# Patient Record
Sex: MALE | Race: WHITE | NOT HISPANIC OR LATINO | Employment: OTHER | URBAN - METROPOLITAN AREA
[De-identification: names, ages, dates, MRNs, and addresses within clinical notes are randomized per-mention and may not be internally consistent; named-entity substitution may affect disease eponyms.]

---

## 2021-01-04 DIAGNOSIS — Z23 NEED FOR VACCINATION: ICD-10-CM

## 2021-01-04 PROCEDURE — 91300 COVID-19, MRNA, LNP-S, PF, 30 MCG/0.3 ML DOSE VACCINE: CPT | Mod: PBBFAC

## 2021-01-25 ENCOUNTER — IMMUNIZATION (OUTPATIENT)
Dept: OBSTETRICS AND GYNECOLOGY | Facility: CLINIC | Age: 81
End: 2021-01-25
Payer: MEDICARE

## 2021-01-25 DIAGNOSIS — Z23 NEED FOR VACCINATION: Primary | ICD-10-CM

## 2021-01-25 PROCEDURE — 91300 COVID-19, MRNA, LNP-S, PF, 30 MCG/0.3 ML DOSE VACCINE: CPT | Mod: PBBFAC

## 2021-01-25 PROCEDURE — 0002A COVID-19, MRNA, LNP-S, PF, 30 MCG/0.3 ML DOSE VACCINE: CPT | Mod: PBBFAC

## 2021-05-06 ENCOUNTER — OFFICE VISIT (OUTPATIENT)
Dept: OPTOMETRY | Facility: CLINIC | Age: 81
End: 2021-05-06
Payer: MEDICARE

## 2021-05-06 DIAGNOSIS — H35.3132 NONEXUDATIVE AGE-RELATED MACULAR DEGENERATION, BILATERAL, INTERMEDIATE DRY STAGE: ICD-10-CM

## 2021-05-06 DIAGNOSIS — H52.223 MYOPIA OF BOTH EYES WITH REGULAR ASTIGMATISM: ICD-10-CM

## 2021-05-06 DIAGNOSIS — Z13.5 GLAUCOMA SCREENING: ICD-10-CM

## 2021-05-06 DIAGNOSIS — H52.13 MYOPIA OF BOTH EYES WITH REGULAR ASTIGMATISM: ICD-10-CM

## 2021-05-06 DIAGNOSIS — E11.9 DIABETES MELLITUS TYPE 2 WITHOUT RETINOPATHY: Primary | ICD-10-CM

## 2021-05-06 DIAGNOSIS — H26.491 PCO (POSTERIOR CAPSULAR OPACIFICATION), RIGHT: ICD-10-CM

## 2021-05-06 PROCEDURE — 92015 PR REFRACTION: ICD-10-PCS | Mod: S$GLB,,, | Performed by: OPTOMETRIST

## 2021-05-06 PROCEDURE — 92004 COMPRE OPH EXAM NEW PT 1/>: CPT | Mod: S$GLB,,, | Performed by: OPTOMETRIST

## 2021-05-06 PROCEDURE — 3288F PR FALLS RISK ASSESSMENT DOCUMENTED: ICD-10-PCS | Mod: CPTII,S$GLB,, | Performed by: OPTOMETRIST

## 2021-05-06 PROCEDURE — 3288F FALL RISK ASSESSMENT DOCD: CPT | Mod: CPTII,S$GLB,, | Performed by: OPTOMETRIST

## 2021-05-06 PROCEDURE — 92004 PR EYE EXAM, NEW PATIENT,COMPREHESV: ICD-10-PCS | Mod: S$GLB,,, | Performed by: OPTOMETRIST

## 2021-05-06 PROCEDURE — 92015 DETERMINE REFRACTIVE STATE: CPT | Mod: S$GLB,,, | Performed by: OPTOMETRIST

## 2021-05-06 PROCEDURE — 1101F PR PT FALLS ASSESS DOC 0-1 FALLS W/OUT INJ PAST YR: ICD-10-PCS | Mod: CPTII,S$GLB,, | Performed by: OPTOMETRIST

## 2021-05-06 PROCEDURE — 99999 PR PBB SHADOW E&M-EST. PATIENT-LVL II: CPT | Mod: PBBFAC,,, | Performed by: OPTOMETRIST

## 2021-05-06 PROCEDURE — 1101F PT FALLS ASSESS-DOCD LE1/YR: CPT | Mod: CPTII,S$GLB,, | Performed by: OPTOMETRIST

## 2021-05-06 PROCEDURE — 99999 PR PBB SHADOW E&M-EST. PATIENT-LVL II: ICD-10-PCS | Mod: PBBFAC,,, | Performed by: OPTOMETRIST

## 2021-06-17 ENCOUNTER — OFFICE VISIT (OUTPATIENT)
Dept: OPTOMETRY | Facility: CLINIC | Age: 81
End: 2021-06-17
Payer: MEDICARE

## 2021-06-17 DIAGNOSIS — H52.223 MYOPIA OF BOTH EYES WITH REGULAR ASTIGMATISM: Primary | ICD-10-CM

## 2021-06-17 DIAGNOSIS — H52.13 MYOPIA OF BOTH EYES WITH REGULAR ASTIGMATISM: Primary | ICD-10-CM

## 2021-06-17 PROCEDURE — 99999 PR PBB SHADOW E&M-EST. PATIENT-LVL II: ICD-10-PCS | Mod: PBBFAC,,, | Performed by: OPTOMETRIST

## 2021-06-17 PROCEDURE — 1126F PR PAIN SEVERITY QUANTIFIED, NO PAIN PRESENT: ICD-10-PCS | Mod: S$GLB,,, | Performed by: OPTOMETRIST

## 2021-06-17 PROCEDURE — 99999 PR PBB SHADOW E&M-EST. PATIENT-LVL II: CPT | Mod: PBBFAC,,, | Performed by: OPTOMETRIST

## 2021-06-17 PROCEDURE — 1101F PT FALLS ASSESS-DOCD LE1/YR: CPT | Mod: CPTII,S$GLB,, | Performed by: OPTOMETRIST

## 2021-06-17 PROCEDURE — 1126F AMNT PAIN NOTED NONE PRSNT: CPT | Mod: S$GLB,,, | Performed by: OPTOMETRIST

## 2021-06-17 PROCEDURE — 3288F PR FALLS RISK ASSESSMENT DOCUMENTED: ICD-10-PCS | Mod: CPTII,S$GLB,, | Performed by: OPTOMETRIST

## 2021-06-17 PROCEDURE — 1101F PR PT FALLS ASSESS DOC 0-1 FALLS W/OUT INJ PAST YR: ICD-10-PCS | Mod: CPTII,S$GLB,, | Performed by: OPTOMETRIST

## 2021-06-17 PROCEDURE — 3288F FALL RISK ASSESSMENT DOCD: CPT | Mod: CPTII,S$GLB,, | Performed by: OPTOMETRIST

## 2021-06-17 PROCEDURE — 99499 NO LOS: ICD-10-PCS | Mod: S$GLB,,, | Performed by: OPTOMETRIST

## 2021-06-17 PROCEDURE — 99499 UNLISTED E&M SERVICE: CPT | Mod: S$GLB,,, | Performed by: OPTOMETRIST

## 2021-09-28 ENCOUNTER — IMMUNIZATION (OUTPATIENT)
Dept: OBSTETRICS AND GYNECOLOGY | Facility: CLINIC | Age: 81
End: 2021-09-28
Payer: MEDICARE

## 2021-09-28 DIAGNOSIS — Z23 NEED FOR VACCINATION: Primary | ICD-10-CM

## 2021-09-28 PROCEDURE — 0003A COVID-19, MRNA, LNP-S, PF, 30 MCG/0.3 ML DOSE VACCINE: CPT | Mod: PBBFAC | Performed by: FAMILY MEDICINE

## 2021-09-28 PROCEDURE — 91300 COVID-19, MRNA, LNP-S, PF, 30 MCG/0.3 ML DOSE VACCINE: CPT | Mod: PBBFAC | Performed by: FAMILY MEDICINE

## 2022-06-20 ENCOUNTER — INITIAL CONSULT (OUTPATIENT)
Dept: VASCULAR SURGERY | Facility: CLINIC | Age: 82
End: 2022-06-20
Payer: MEDICARE

## 2022-06-20 VITALS
WEIGHT: 146.63 LBS | DIASTOLIC BLOOD PRESSURE: 60 MMHG | BODY MASS INDEX: 22.29 KG/M2 | SYSTOLIC BLOOD PRESSURE: 112 MMHG

## 2022-06-20 DIAGNOSIS — I70.213 ATHEROSCLEROSIS OF NATIVE ARTERY OF BOTH LOWER EXTREMITIES WITH INTERMITTENT CLAUDICATION: Primary | ICD-10-CM

## 2022-06-20 DIAGNOSIS — I67.2 CEREBRAL ATHEROSCLEROSIS: ICD-10-CM

## 2022-06-20 PROCEDURE — 99999 PR PBB SHADOW E&M-EST. PATIENT-LVL III: CPT | Mod: PBBFAC,,, | Performed by: SURGERY

## 2022-06-20 PROCEDURE — 99204 OFFICE O/P NEW MOD 45 MIN: CPT | Mod: S$GLB,,, | Performed by: SURGERY

## 2022-06-20 PROCEDURE — 99204 PR OFFICE/OUTPT VISIT, NEW, LEVL IV, 45-59 MIN: ICD-10-PCS | Mod: S$GLB,,, | Performed by: SURGERY

## 2022-06-20 PROCEDURE — 99999 PR PBB SHADOW E&M-EST. PATIENT-LVL III: ICD-10-PCS | Mod: PBBFAC,,, | Performed by: SURGERY

## 2022-06-20 RX ORDER — CLOPIDOGREL BISULFATE 75 MG/1
75 TABLET ORAL DAILY
Qty: 30 TABLET | Refills: 11 | Status: SHIPPED | OUTPATIENT
Start: 2022-06-20 | End: 2022-10-06

## 2022-06-20 RX ORDER — CILOSTAZOL 50 MG/1
50 TABLET ORAL 2 TIMES DAILY
Qty: 90 TABLET | Refills: 11 | Status: SHIPPED | OUTPATIENT
Start: 2022-06-20 | End: 2023-06-20

## 2022-06-20 NOTE — PROGRESS NOTES
Fidencio Graves MD RPVI                       [facility]                         06/20/2022    HPI:  Janak Campbell Jr. is a 81 y.o. male with   Patient Active Problem List   Diagnosis    Lennon's neuroma    DM (diabetes mellitus)    Claudication    Peripheral vascular disease    Type II diabetes mellitus with peripheral circulatory disorder    Hypertension    Hyperlipidemia    Long term (current) use of anticoagulants [V58.61]    Deep vein thrombosis prophylaxis    s/p right total knee replacement 3/11/2015    Inguinal hernia without mention of obstruction or gangrene, unilateral or unspecified, (not specified as recurrent)    being managed by PCP and specialists who is here today for evaluation of PVD.  Patient has complaints of claudication at 2-3 block(s).  Patient states location is bilateral calf occurring for yrs.  Associated signs and symptoms include pain.  Quality is aching and severity is 7/10.  Symptoms began yrs ago; s/p kissing iliac stents approx 2012, 8x38 iCast stents.  Alleviating factors include rest and elevations.  Worsening factors include ambulation.  no rest pain.  no tissue loss.  Patient is diabetic.  Is not on Pletal.  + previous lower extremity interventions.    no MI  no Stroke  Tobacco use: denies  Daily Aspirin: yes  Anticoagulation: no    Past Medical History:   Diagnosis Date    Arthritis     OA    Diabetes mellitus type II     Encounter for blood transfusion     Hyperlipidemia     Hypertension      Past Surgical History:   Procedure Laterality Date    CAROTID ARTERY ANGIOPLASTY  2005    carotid surgery      CATARACT EXTRACTION, BILATERAL  6-4-12    ILIAC ARTERY STENT Bilateral 2013    aortic bifurcation    INGUINAL HERNIA REPAIR Right 2014    JOINT REPLACEMENT      L knee    KNEE SURGERY  03/11/2015    Right      Family History   Problem Relation Age of Onset    Cancer Father         lung     Social History     Socioeconomic  History    Marital status:    Tobacco Use    Smoking status: Former Smoker     Types: Cigarettes     Quit date: 1980     Years since quittin.0    Smokeless tobacco: Never Used   Substance and Sexual Activity    Alcohol use: Yes     Comment: Socailly    Drug use: No       Current Outpatient Medications:     aspirin (ECOTRIN) 81 MG EC tablet, Take 81 mg by mouth once daily., Disp: , Rfl:     baclofen (LIORESAL) 10 MG tablet, , Disp: , Rfl: 0    hydrocodone-acetaminophen 5-325mg (NORCO) 5-325 mg per tablet, Take 1-2 tablets by mouth every 4 (four) hours as needed for Pain., Disp: 40 tablet, Rfl: 0    lisinopril (PRINIVIL,ZESTRIL) 20 MG tablet, Takes only 10 mg every am., Disp: , Rfl:     metformin (GLUCOPHAGE) 500 MG tablet, Take by mouth. 1 Tablet Oral Twice a day , Disp: , Rfl:     methylPREDNISolone (MEDROL DOSEPACK) 4 mg tablet, , Disp: , Rfl: 0    multivitamin capsule, Take by mouth. 1 Capsule Oral Every day, Disp: , Rfl:     simvastatin (ZOCOR) 40 MG tablet, Take by mouth. 1 Tablet Oral Every day, Disp: , Rfl:     cilostazoL (PLETAL) 50 MG Tab, Take 1 tablet (50 mg total) by mouth 2 (two) times daily. If patient tolerate medication after 4 weeks, increase dose to 100 mg twice daily., Disp: 90 tablet, Rfl: 11    REVIEW OF SYSTEMS:  General: No fevers or chills; ENT: No sore throat; Allergy and Immunology: no persistent infections; Hematological and Lymphatic: No history of bleeding or easy bruising; Endocrine: negative; Respiratory: no cough, shortness of breath, or wheezing; Cardiovascular: no chest pain or dyspnea on exertion; + claudication, no rest pain; Gastrointestinal: no abdominal pain/back, change in bowel habits, or bloody stools; Genito-Urinary: no dysuria, trouble voiding, or hematuria; Musculoskeletal: negative, no wound; Neurological: no TIA or stroke symptoms; Psychiatric: no nervousness, anxiety or depression.    PHYSICAL EXAM:                General appearance:   Alert, well-appearing, and in no distress.  Oriented to person, place, and time                    Neurological: Normal speech, no focal findings noted; CN II - XII grossly intact. RLE with sensation to light touch, LLE with sensation to light touch.            Musculoskeletal: Digits/nail without cyanosis/clubbing.  Strength 5/5 BLE.                    Neck: Supple, no significant adenopathy, no carotid bruit can be auscultated                  Chest:  Clear to auscultation, no wheezes, rales or rhonchi, symmetric air entry. No use of accessory muscles               Cardiac: Normal rate and regular rhythm, S1 and S2 normal            Abdomen: Soft, nontender, nondistended, no masses or organomegaly, no hernia     No rebound tenderness noted; bowel sounds normal     Pulsatile aortic mass is non palpable.     No groin adenopathy      Extremities:2+ R femoral pulse, non palpable L femoral pulse     doppler+ R popliteal pulse, doppler+ L popliteal pulse     doppler+ R PT pulse, doppler+ L PT pulse     doppler+ R DP pulse, 2+ L DP pulse     no RLE edema, no LLE edema    Skin: RLE no tissue loss; LLE no tissue loss    LAB RESULTS:  No results found for: CBC  Lab Results   Component Value Date    LABPROT 11.1 03/12/2015    INR 1.5 04/06/2015     Lab Results   Component Value Date     05/28/2015    K 4.3 05/28/2015     05/28/2015    CO2 26 05/28/2015     (H) 05/28/2015    BUN 22 05/28/2015    CREATININE 0.9 05/28/2015    CALCIUM 9.9 05/28/2015    ANIONGAP 10 05/28/2015    EGFRNONAA >60 05/28/2015     Lab Results   Component Value Date    WBC 9.98 05/28/2015    RBC 4.89 05/28/2015    HGB 13.5 (L) 05/28/2015    HCT 41.9 05/28/2015    MCV 86 05/28/2015    MCH 27.6 05/28/2015    MCHC 32.2 05/28/2015    RDW 14.4 05/28/2015     05/28/2015    MPV 11.3 05/28/2015    GRAN 5.1 05/28/2015    GRAN 51.4 05/28/2015    LYMPH 3.4 05/28/2015    LYMPH 33.6 05/28/2015    MONO 1.1 (H) 05/28/2015    MONO 11.0  05/28/2015    EOS 0.4 05/28/2015    BASO 0.04 05/28/2015    EOSINOPHIL 3.6 05/28/2015    BASOPHIL 0.4 05/28/2015    DIFFMETHOD Automated 05/28/2015     .  Lab Results   Component Value Date    HGBA1C 5.7 (H) 08/04/2020       IMAGING:  All pertinent imaging has been reviewed and interpreted independently.    IMP/PLAN:  81 y.o. male with   Patient Active Problem List   Diagnosis    Lennon's neuroma    DM (diabetes mellitus)    Claudication    Peripheral vascular disease    Type II diabetes mellitus with peripheral circulatory disorder    Hypertension    Hyperlipidemia    Long term (current) use of anticoagulants [V58.61]    Deep vein thrombosis prophylaxis    s/p right total knee replacement 3/11/2015    Inguinal hernia without mention of obstruction or gangrene, unilateral or unspecified, (not specified as recurrent)    being managed by PCP and specialists who is here today for evaluation of PVD.    -Concern for LLE<RLE PVD with lifestyle limiting bilateral calf claudication, no rest pain, no wound.  Imaging reviewed. - rec daily ASA, Rx Pletal   -BLE arterial US and TP  -Exercise  -Carotid US  -Heart healthy lifestyle    I spent 12 minutes evaluating this patient and greater than 50% of the time was spent counseling, coordinator care and discussing the plan of care.  All questions were answered and patient stated understanding with agreement with the above treatment plan.    Fidencio Graves MD [facility]  Vascular and Endovascular Surgery

## 2022-06-23 ENCOUNTER — TELEPHONE (OUTPATIENT)
Dept: VASCULAR SURGERY | Facility: CLINIC | Age: 82
End: 2022-06-23
Payer: MEDICARE

## 2022-06-23 NOTE — TELEPHONE ENCOUNTER
Spoke to pt and advised him of his follow up appt with the MD and the med that was prescribed    ----- Message from Shantell Ha sent at 6/22/2022  9:46 AM CDT -----  Type: Patient Call Back    Who called: Xiomara - wife    What is the request in detail: pt is confused as to whether to take meds now since test arent til Sept. Please call and advise    Can the clinic reply by [facility]?no    Would the patient rather a call back or a response via [facility]? call    Best call back number.[phone] (home)

## 2022-09-22 ENCOUNTER — HOSPITAL ENCOUNTER (OUTPATIENT)
Dept: CARDIOLOGY | Facility: HOSPITAL | Age: 82
Discharge: HOME | End: 2022-09-22
Attending: SURGERY
Payer: MEDICARE

## 2022-09-22 ENCOUNTER — OFFICE VISIT (OUTPATIENT)
Dept: VASCULAR SURGERY | Facility: CLINIC | Age: 82
End: 2022-09-22
Payer: MEDICARE

## 2022-09-22 VITALS — BODY MASS INDEX: 22.66 KG/M2 | WEIGHT: 149.06 LBS

## 2022-09-22 DIAGNOSIS — I73.9 CLAUDICATION: Primary | ICD-10-CM

## 2022-09-22 DIAGNOSIS — I70.213 ATHEROSCLEROSIS OF NATIVE ARTERY OF BOTH LOWER EXTREMITIES WITH INTERMITTENT CLAUDICATION: Primary | ICD-10-CM

## 2022-09-22 DIAGNOSIS — I70.213 ATHEROSCLEROSIS OF NATIVE ARTERY OF BOTH LOWER EXTREMITIES WITH INTERMITTENT CLAUDICATION: ICD-10-CM

## 2022-09-22 DIAGNOSIS — I67.2 CEREBRAL ATHEROSCLEROSIS: ICD-10-CM

## 2022-09-22 PROCEDURE — 93880 EXTRACRANIAL BILAT STUDY: CPT

## 2022-09-22 PROCEDURE — 93922 ANKLE BRACHIAL INDICES (ABI): ICD-10-PCS | Mod: 26,,, | Performed by: SURGERY

## 2022-09-22 PROCEDURE — 93922 UPR/L XTREMITY ART 2 LEVELS: CPT

## 2022-09-22 PROCEDURE — 93925 CV US DOPPLER ARTERIAL LEGS BILATERAL (CUPID ONLY): ICD-10-PCS | Mod: 26,,, | Performed by: SURGERY

## 2022-09-22 PROCEDURE — 93922 UPR/L XTREMITY ART 2 LEVELS: CPT | Mod: 26,,, | Performed by: SURGERY

## 2022-09-22 PROCEDURE — 93925 LOWER EXTREMITY STUDY: CPT

## 2022-09-22 PROCEDURE — 93880 CV US DOPPLER CAROTID (CUPID ONLY): ICD-10-PCS | Mod: 26,,, | Performed by: SURGERY

## 2022-09-22 PROCEDURE — 93925 LOWER EXTREMITY STUDY: CPT | Mod: 26,,, | Performed by: SURGERY

## 2022-09-22 PROCEDURE — 99213 PR OFFICE/OUTPT VISIT, EST, LEVL III, 20-29 MIN: ICD-10-PCS | Mod: S$GLB,,, | Performed by: SURGERY

## 2022-09-22 PROCEDURE — 93880 EXTRACRANIAL BILAT STUDY: CPT | Mod: 26,,, | Performed by: SURGERY

## 2022-09-22 PROCEDURE — 1159F MED LIST DOCD IN RCRD: CPT | Mod: CPTII,S$GLB,, | Performed by: SURGERY

## 2022-09-22 PROCEDURE — 99999 PR PBB SHADOW E&M-EST. PATIENT-LVL III: ICD-10-PCS | Mod: PBBFAC,,, | Performed by: SURGERY

## 2022-09-22 PROCEDURE — 1126F AMNT PAIN NOTED NONE PRSNT: CPT | Mod: CPTII,S$GLB,, | Performed by: SURGERY

## 2022-09-22 PROCEDURE — 1126F PR PAIN SEVERITY QUANTIFIED, NO PAIN PRESENT: ICD-10-PCS | Mod: CPTII,S$GLB,, | Performed by: SURGERY

## 2022-09-22 PROCEDURE — 99213 OFFICE O/P EST LOW 20 MIN: CPT | Mod: S$GLB,,, | Performed by: SURGERY

## 2022-09-22 PROCEDURE — 1159F PR MEDICATION LIST DOCUMENTED IN MEDICAL RECORD: ICD-10-PCS | Mod: CPTII,S$GLB,, | Performed by: SURGERY

## 2022-09-22 PROCEDURE — 99999 PR PBB SHADOW E&M-EST. PATIENT-LVL III: CPT | Mod: PBBFAC,,, | Performed by: SURGERY

## 2022-09-22 NOTE — PROGRESS NOTES
[facility]                         09/22/2022    HPI:  Janak Campbell Jr. is a 81 y.o. male with   Patient Active Problem List   Diagnosis    Lennon's neuroma    DM (diabetes mellitus)    Claudication    Peripheral vascular disease    Type II diabetes mellitus with peripheral circulatory disorder    Hypertension    Hyperlipidemia    Long term (current) use of anticoagulants [V58.61]    Deep vein thrombosis prophylaxis    s/p right total knee replacement 3/11/2015    Inguinal hernia without mention of obstruction or gangrene, unilateral or unspecified, (not specified as recurrent)    being managed by PCP and specialists who is here today for evaluation of PVD.  Patient has complaints of claudication at 2-3 block(s).  Patient states location is bilateral calf occurring for yrs.  Associated signs and symptoms include pain.  Quality is aching and severity is 7/10.  Symptoms began yrs ago; s/p kissing iliac stents approx 2012, 8x38 iCast stents.  Alleviating factors include rest and elevations.  Worsening factors include ambulation.  no rest pain.  no tissue loss.  Patient is diabetic.  Is on Pletal.      no MI  no Stroke  Tobacco use: denies  Daily Aspirin: yes  Anticoagulation: no    9/2022:      Past Medical History:   Diagnosis Date    Arthritis     OA    Diabetes mellitus type II     Encounter for blood transfusion     Hyperlipidemia     Hypertension      Past Surgical History:   Procedure Laterality Date    CAROTID ARTERY ANGIOPLASTY  2005    carotid surgery      CATARACT EXTRACTION, BILATERAL  6-4-12    ILIAC ARTERY STENT Bilateral 2013    aortic bifurcation    INGUINAL HERNIA REPAIR Right 2014    JOINT REPLACEMENT      L knee    KNEE SURGERY  03/11/2015    Right      Family History   Problem Relation Age of Onset    Cancer Father         lung     Social History     Socioeconomic History    Marital status:    Tobacco Use    Smoking status: Former     Types:  Cigarettes     Quit date: 1980     Years since quittin.2    Smokeless tobacco: Never   Substance and Sexual Activity    Alcohol use: Yes     Comment: Socailly    Drug use: No       Current Outpatient Medications:     aspirin (ECOTRIN) 81 MG EC tablet, Take 81 mg by mouth once daily., Disp: , Rfl:     baclofen (LIORESAL) 10 MG tablet, , Disp: , Rfl: 0    cilostazoL (PLETAL) 50 MG Tab, Take 1 tablet (50 mg total) by mouth 2 (two) times daily. If patient tolerate medication after 4 weeks, increase dose to 100 mg twice daily., Disp: 90 tablet, Rfl: 11    clopidogreL (PLAVIX) 75 mg tablet, Take 1 tablet (75 mg total) by mouth once daily., Disp: 30 tablet, Rfl: 11    hydrocodone-acetaminophen 5-325mg (NORCO) 5-325 mg per tablet, Take 1-2 tablets by mouth every 4 (four) hours as needed for Pain., Disp: 40 tablet, Rfl: 0    lisinopril (PRINIVIL,ZESTRIL) 20 MG tablet, Takes only 10 mg every am., Disp: , Rfl:     metformin (GLUCOPHAGE) 500 MG tablet, Take by mouth. 1 Tablet Oral Twice a day , Disp: , Rfl:     methylPREDNISolone (MEDROL DOSEPACK) 4 mg tablet, , Disp: , Rfl: 0    multivitamin capsule, Take by mouth. 1 Capsule Oral Every day, Disp: , Rfl:     simvastatin (ZOCOR) 40 MG tablet, Take by mouth. 1 Tablet Oral Every day, Disp: , Rfl:     REVIEW OF SYSTEMS:  General: No fevers or chills; ENT: No sore throat; Allergy and Immunology: no persistent infections; Hematological and Lymphatic: No history of bleeding or easy bruising; Endocrine: negative; Respiratory: no cough, shortness of breath, or wheezing; Cardiovascular: no chest pain or dyspnea on exertion; + claudication, no rest pain; Gastrointestinal: no abdominal pain/back, change in bowel habits, or bloody stools; Genito-Urinary: no dysuria, trouble voiding, or hematuria; Musculoskeletal: negative, no wound; Neurological: no TIA or stroke symptoms; Psychiatric: no nervousness, anxiety or depression.    PHYSICAL EXAM:                General appearance:   Alert, well-appearing, and in no distress.  Oriented to person, place, and time                    Neurological: Normal speech, no focal findings noted; CN II - XII grossly intact. RLE with sensation to light touch, LLE with sensation to light touch.            Musculoskeletal: Digits/nail without cyanosis/clubbing.  Strength 5/5 BLE.                    Neck: Supple, no significant adenopathy, no carotid bruit can be auscultated                  Chest:  Clear to auscultation, no wheezes, rales or rhonchi, symmetric air entry. No use of accessory muscles               Cardiac: Normal rate and regular rhythm, S1 and S2 normal            Abdomen: Soft, nontender, nondistended, no masses or organomegaly, no hernia     No rebound tenderness noted; bowel sounds normal     Pulsatile aortic mass is non palpable.     No groin adenopathy      Extremities:2+ R femoral pulse, non palpable L femoral pulse     doppler+ R popliteal pulse, doppler+ L popliteal pulse     doppler+ R PT pulse, doppler+ L PT pulse     doppler+ R DP pulse, 2+ L DP pulse     no RLE edema, no LLE edema    Skin: RLE no tissue loss; LLE no tissue loss    LAB RESULTS:  No results found for: CBC  Lab Results   Component Value Date    LABPROT 11.1 03/12/2015    INR 1.5 04/06/2015     Lab Results   Component Value Date     05/28/2015    K 4.3 05/28/2015     05/28/2015    CO2 26 05/28/2015     (H) 05/28/2015    BUN 22 05/28/2015    CREATININE 0.9 05/28/2015    CALCIUM 9.9 05/28/2015    ANIONGAP 10 05/28/2015    EGFRNONAA >60 05/28/2015     Lab Results   Component Value Date    WBC 9.98 05/28/2015    RBC 4.89 05/28/2015    HGB 13.5 (L) 05/28/2015    HCT 41.9 05/28/2015    MCV 86 05/28/2015    MCH 27.6 05/28/2015    MCHC 32.2 05/28/2015    RDW 14.4 05/28/2015     05/28/2015    MPV 11.3 05/28/2015    GRAN 5.1 05/28/2015    GRAN 51.4 05/28/2015    LYMPH 3.4 05/28/2015    LYMPH 33.6 05/28/2015    MONO 1.1 (H) 05/28/2015    MONO 11.0  05/28/2015    EOS 0.4 05/28/2015    BASO 0.04 05/28/2015    EOSINOPHIL 3.6 05/28/2015    BASOPHIL 0.4 05/28/2015    DIFFMETHOD Automated 05/28/2015     .  Lab Results   Component Value Date    HGBA1C 5.7 (H) 08/04/2020       IMAGING:  All pertinent imaging has been reviewed and interpreted independently.    IMP/PLAN:  81 y.o. male with   Patient Active Problem List   Diagnosis    Lennon's neuroma    DM (diabetes mellitus)    Claudication    Peripheral vascular disease    Type II diabetes mellitus with peripheral circulatory disorder    Hypertension    Hyperlipidemia    Long term (current) use of anticoagulants [V58.61]    Deep vein thrombosis prophylaxis    s/p right total knee replacement 3/11/2015    Inguinal hernia without mention of obstruction or gangrene, unilateral or unspecified, (not specified as recurrent)    being managed by PCP and specialists who is here today for evaluation of PVD.    -Concern for LLE<RLE PVD with lifestyle limiting bilateral calf claudication, no rest pain, no wound.  Imaging reviewed. - rec daily ASA, Pletal, Plavix  -Exercise  -Heart healthy lifestyle  - RTC 6 month with LAURITA/TP and BLE US    I spent 20 minutes evaluating this patient and greater than 50% of the time was spent counseling, coordinator care and discussing the plan of care.  All questions were answered and patient stated understanding with agreement with the above treatment plan.    Valente Richardson NP  Vascular and Endovascular Surgery

## 2022-10-04 LAB
LEFT ABI: 0.79
LEFT ANT TIBIAL SYS PSV: 86 CM/S
LEFT ARM BP: 161 MMHG
LEFT CBA DIAS: 19 CM/S
LEFT CBA SYS: 84 CM/S
LEFT CCA DIST DIAS: 9 CM/S
LEFT CCA DIST SYS: 46 CM/S
LEFT CCA MID DIAS: 8 CM/S
LEFT CCA MID SYS: 57 CM/S
LEFT CCA PROX DIAS: 10 CM/S
LEFT CCA PROX SYS: 91 CM/S
LEFT CFA PSV: 162 CM/S
LEFT DORSALIS PEDIS: 134 MMHG
LEFT ECA DIAS: 3 CM/S
LEFT ECA SYS: 239 CM/S
LEFT EXTERNAL ILIAC PSV: 206 CM/S
LEFT ICA DIST DIAS: 40 CM/S
LEFT ICA DIST SYS: 125 CM/S
LEFT ICA MID DIAS: 25 CM/S
LEFT ICA MID SYS: 93 CM/S
LEFT ICA PROX DIAS: 29 CM/S
LEFT ICA PROX SYS: 93 CM/S
LEFT PERONEAL SYS PSV: 52 CM/S
LEFT POPLITEAL PSV: 57 CM/S
LEFT POST TIBIAL SYS PSV: 0 CM/S
LEFT POSTERIOR TIBIAL: 120 MMHG
LEFT PROFUNDA SYS PSV: 136 CM/S
LEFT SUPER FEMORAL DIST SYS PSV: 126 CM/S
LEFT SUPER FEMORAL MID SYS PSV: 120 CM/S
LEFT SUPER FEMORAL OSTIAL SYS PSV: 121 CM/S
LEFT SUPER FEMORAL PROX SYS PSV: 245 CM/S
LEFT TBI: 0.84
LEFT TIB/PER TRUNK SYS PSV: 41 CM/S
LEFT TOE PRESSURE: 142 MMHG
LEFT VERTEBRAL DIAS: 19 CM/S
LEFT VERTEBRAL SYS: 71 CM/S
OHS CV CAROTID RIGHT ICA EDV HIGHEST: 28
OHS CV CAROTID ULTRASOUND LEFT ICA/CCA RATIO: 2.72
OHS CV CAROTID ULTRASOUND RIGHT ICA/CCA RATIO: 2.5
OHS CV LEFT LOWER EXTREMITY ABI (NO CALC): 0.79
OHS CV PV CAROTID LEFT HIGHEST CCA: 91
OHS CV PV CAROTID LEFT HIGHEST ICA: 125
OHS CV PV CAROTID RIGHT HIGHEST CCA: 74
OHS CV PV CAROTID RIGHT HIGHEST ICA: 125
OHS CV RIGHT ABI LOWER EXTREMITY (NO CALC): 0.74
OHS CV US CAROTID LEFT HIGHEST EDV: 40
RIGHT ABI: 0.74
RIGHT ANT TIBIAL SYS PSV: 33 CM/S
RIGHT ARM BP: 169 MMHG
RIGHT CBA DIAS: 9 CM/S
RIGHT CBA SYS: 43 CM/S
RIGHT CCA DIST DIAS: 11 CM/S
RIGHT CCA DIST SYS: 50 CM/S
RIGHT CCA MID DIAS: 11 CM/S
RIGHT CCA MID SYS: 45 CM/S
RIGHT CCA PROX DIAS: 9 CM/S
RIGHT CCA PROX SYS: 74 CM/S
RIGHT CFA PSV: 115 CM/S
RIGHT DORSALIS PEDIS: 118 MMHG
RIGHT ECA DIAS: 5 CM/S
RIGHT ECA SYS: 67 CM/S
RIGHT EXTERNAL ILLIAC PSV: 132 CM/S
RIGHT ICA DIST DIAS: 15 CM/S
RIGHT ICA DIST SYS: 74 CM/S
RIGHT ICA MID DIAS: 28 CM/S
RIGHT ICA MID SYS: 125 CM/S
RIGHT ICA PROX DIAS: 14 CM/S
RIGHT ICA PROX SYS: 48 CM/S
RIGHT PERONEAL SYS PSV: 65 CM/S
RIGHT POPLITEAL PSV: 41 CM/S
RIGHT POST TIBIAL SYS PSV: 0 CM/S
RIGHT POSTERIOR TIBIAL: 125 MMHG
RIGHT PROFUNDA SYS PSV: 168 CM/S
RIGHT SUPER FEMORAL DIST SYS PSV: 43 CM/S
RIGHT SUPER FEMORAL MID SYS PSV: 96 CM/S
RIGHT SUPER FEMORAL OSTIAL SYS PSV: 82 CM/S
RIGHT SUPER FEMORAL PROX SYS PSV: 600 CM/S
RIGHT TBI: 0.61
RIGHT TIB/PER TRUNK SYS PSV: 38 CM/S
RIGHT TOE PRESSURE: 103 MMHG
RIGHT VERTEBRAL DIAS: 11 CM/S
RIGHT VERTEBRAL SYS: 52 CM/S

## 2022-10-12 RX ORDER — CLOPIDOGREL BISULFATE 75 MG/1
75 TABLET ORAL DAILY
Qty: 30 TABLET | Refills: 11 | Status: SHIPPED | OUTPATIENT
Start: 2022-10-12 | End: 2023-02-21 | Stop reason: SDUPTHER

## 2022-10-17 ENCOUNTER — TELEPHONE (OUTPATIENT)
Dept: VASCULAR SURGERY | Facility: CLINIC | Age: 82
End: 2022-10-17
Payer: MEDICARE

## 2022-10-17 NOTE — TELEPHONE ENCOUNTER
Attempted to call the pt twice no answer left voicemail      ----- Message from Fidencio Graves MD sent at 10/14/2022  5:20 PM CDT -----  Please get hard copy prescription for me to fill out Monday, thank you  ----- Message -----  From: Kamran Burleson LPN  Sent: 10/13/2022  12:57 PM CDT  To: Fidencio Graves MD    [facility] wants a script faxed to them [phone].    ----- Message -----  From: Fidencio Graves MD  Sent: 10/12/2022   5:03 PM CDT  To: Kamran Burleson LPN    I resent it, plesaes have them contact [facility]  ----- Message -----  From: Kamran Burleson LPN  Sent: 10/11/2022  12:01 PM CDT  To: Fidencio Graves MD      ----- Message -----  From: Sharon Waters  Sent: 10/11/2022  11:26 AM CDT  To: [facility] Staff    Type: Patient Call Back    Who called: wife     What is the request in detail: Patient's wife called [facility] and was told they never received the refill for patient's Plavix. Would like to know if a nurse can call them and find out what's going on with his medication. [facility] [phone]     Can the clinic reply by [facility]? No     Would the patient rather a call back or a response via [facility]? Call back     Best call back number: [phone]

## 2023-02-21 ENCOUNTER — HOSPITAL ENCOUNTER (EMERGENCY)
Facility: HOSPITAL | Age: 83
Discharge: HOME | End: 2023-02-21
Attending: EMERGENCY MEDICINE
Payer: MEDICARE

## 2023-02-21 VITALS
BODY MASS INDEX: 22.13 KG/M2 | RESPIRATION RATE: 22 BRPM | DIASTOLIC BLOOD PRESSURE: 93 MMHG | HEART RATE: 88 BPM | SYSTOLIC BLOOD PRESSURE: 177 MMHG | OXYGEN SATURATION: 95 % | HEIGHT: 68 IN | WEIGHT: 146 LBS | TEMPERATURE: 98 F

## 2023-02-21 DIAGNOSIS — I10 PRIMARY HYPERTENSION: ICD-10-CM

## 2023-02-21 DIAGNOSIS — G45.9 TIA (TRANSIENT ISCHEMIC ATTACK): Primary | ICD-10-CM

## 2023-02-21 LAB
ALBUMIN SERPL BCP-MCNC: 3.9 G/DL (ref 3.5–5.2)
ALP SERPL-CCNC: 52 U/L (ref 55–135)
ALT SERPL W/O P-5'-P-CCNC: 16 U/L (ref 10–44)
ANION GAP SERPL CALC-SCNC: 14 MMOL/L (ref 8–16)
AST SERPL-CCNC: 17 U/L (ref 10–40)
BASOPHILS # BLD AUTO: 0.08 K/UL (ref 0–0.2)
BASOPHILS NFR BLD: 0.8 % (ref 0–1.9)
BILIRUB SERPL-MCNC: 0.4 MG/DL (ref 0.1–1)
BUN SERPL-MCNC: 23 MG/DL (ref 8–23)
CALCIUM SERPL-MCNC: 9.6 MG/DL (ref 8.7–10.5)
CHLORIDE SERPL-SCNC: 96 MMOL/L (ref 95–110)
CO2 SERPL-SCNC: 26 MMOL/L (ref 23–29)
CREAT SERPL-MCNC: 1 MG/DL (ref 0.5–1.4)
DIFFERENTIAL METHOD: ABNORMAL
EOSINOPHIL # BLD AUTO: 0.4 K/UL (ref 0–0.5)
EOSINOPHIL NFR BLD: 4.1 % (ref 0–8)
ERYTHROCYTE [DISTWIDTH] IN BLOOD BY AUTOMATED COUNT: 22.5 % (ref 11.5–14.5)
EST. GFR  (NO RACE VARIABLE): >60 ML/MIN/1.73 M^2
GLUCOSE SERPL-MCNC: 99 MG/DL (ref 70–110)
HCT VFR BLD AUTO: 36.4 % (ref 40–54)
HCV AB SERPL QL IA: NORMAL
HGB BLD-MCNC: 11.7 G/DL (ref 14–18)
HIV 1+2 AB+HIV1 P24 AG SERPL QL IA: NORMAL
IMM GRANULOCYTES # BLD AUTO: 0.03 K/UL (ref 0–0.04)
IMM GRANULOCYTES NFR BLD AUTO: 0.3 % (ref 0–0.5)
INR PPP: 1 (ref 0.8–1.2)
LYMPHOCYTES # BLD AUTO: 3 K/UL (ref 1–4.8)
LYMPHOCYTES NFR BLD: 29.8 % (ref 18–48)
MCH RBC QN AUTO: 25.9 PG (ref 27–31)
MCHC RBC AUTO-ENTMCNC: 32.1 G/DL (ref 32–36)
MCV RBC AUTO: 81 FL (ref 82–98)
MONOCYTES # BLD AUTO: 1.1 K/UL (ref 0.3–1)
MONOCYTES NFR BLD: 11.2 % (ref 4–15)
NEUTROPHILS # BLD AUTO: 5.5 K/UL (ref 1.8–7.7)
NEUTROPHILS NFR BLD: 53.8 % (ref 38–73)
NRBC BLD-RTO: 0 /100 WBC
PLATELET # BLD AUTO: 236 K/UL (ref 150–450)
PMV BLD AUTO: 9.5 FL (ref 9.2–12.9)
POTASSIUM SERPL-SCNC: 4.6 MMOL/L (ref 3.5–5.1)
PROT SERPL-MCNC: 6.5 G/DL (ref 6–8.4)
PROTHROMBIN TIME: 10.6 SEC (ref 9–12.5)
RBC # BLD AUTO: 4.51 M/UL (ref 4.6–6.2)
SODIUM SERPL-SCNC: 136 MMOL/L (ref 136–145)
TSH SERPL DL<=0.005 MIU/L-ACNC: 2.67 UIU/ML (ref 0.4–4)
WBC # BLD AUTO: 10.16 K/UL (ref 3.9–12.7)

## 2023-02-21 PROCEDURE — 99285 EMERGENCY DEPT VISIT HI MDM: CPT | Mod: ,,, | Performed by: EMERGENCY MEDICINE

## 2023-02-21 PROCEDURE — 99284 PR EMERGENCY DEPT VISIT,LEVEL IV: ICD-10-PCS | Mod: GC,,, | Performed by: PSYCHIATRY & NEUROLOGY

## 2023-02-21 PROCEDURE — 80053 COMPREHEN METABOLIC PANEL: CPT | Performed by: EMERGENCY MEDICINE

## 2023-02-21 PROCEDURE — 85025 COMPLETE CBC W/AUTO DIFF WBC: CPT | Performed by: EMERGENCY MEDICINE

## 2023-02-21 PROCEDURE — 93010 EKG 12-LEAD: ICD-10-PCS | Mod: ,,, | Performed by: INTERNAL MEDICINE

## 2023-02-21 PROCEDURE — 93010 ELECTROCARDIOGRAM REPORT: CPT | Mod: ,,, | Performed by: INTERNAL MEDICINE

## 2023-02-21 PROCEDURE — 25000003 PHARM REV CODE 250: Performed by: EMERGENCY MEDICINE

## 2023-02-21 PROCEDURE — 87389 HIV-1 AG W/HIV-1&-2 AB AG IA: CPT | Performed by: PHYSICIAN ASSISTANT

## 2023-02-21 PROCEDURE — 85610 PROTHROMBIN TIME: CPT | Performed by: EMERGENCY MEDICINE

## 2023-02-21 PROCEDURE — 99285 EMERGENCY DEPT VISIT HI MDM: CPT | Mod: 25

## 2023-02-21 PROCEDURE — 99284 EMERGENCY DEPT VISIT MOD MDM: CPT | Mod: GC,,, | Performed by: PSYCHIATRY & NEUROLOGY

## 2023-02-21 PROCEDURE — 93005 ELECTROCARDIOGRAM TRACING: CPT

## 2023-02-21 PROCEDURE — 99285 PR EMERGENCY DEPT VISIT,LEVEL V: ICD-10-PCS | Mod: ,,, | Performed by: EMERGENCY MEDICINE

## 2023-02-21 PROCEDURE — 84443 ASSAY THYROID STIM HORMONE: CPT | Performed by: EMERGENCY MEDICINE

## 2023-02-21 PROCEDURE — 86803 HEPATITIS C AB TEST: CPT | Performed by: PHYSICIAN ASSISTANT

## 2023-02-21 RX ORDER — CLOPIDOGREL BISULFATE 75 MG/1
75 TABLET ORAL DAILY
Qty: 30 TABLET | Refills: 11 | Status: SHIPPED | OUTPATIENT
Start: 2023-02-21 | End: 2024-02-21

## 2023-02-21 RX ORDER — CLOPIDOGREL BISULFATE 75 MG/1
75 TABLET ORAL
Status: COMPLETED | OUTPATIENT
Start: 2023-02-21 | End: 2023-02-21

## 2023-02-21 RX ORDER — LISINOPRIL 10 MG/1
10 TABLET ORAL
Status: COMPLETED | OUTPATIENT
Start: 2023-02-21 | End: 2023-02-21

## 2023-02-21 RX ORDER — LISINOPRIL 20 MG/1
10 TABLET ORAL DAILY
Qty: 30 TABLET | Refills: 0 | Status: SHIPPED | OUTPATIENT
Start: 2023-02-21

## 2023-02-21 RX ADMIN — LISINOPRIL 10 MG: 10 TABLET ORAL at 08:02

## 2023-02-21 RX ADMIN — CLOPIDOGREL BISULFATE 75 MG: 75 TABLET ORAL at 08:02

## 2023-02-22 ENCOUNTER — HOSPITAL ENCOUNTER (EMERGENCY)
Facility: HOSPITAL | Age: 83
Discharge: HOME | End: 2023-02-22
Attending: EMERGENCY MEDICINE
Payer: MEDICARE

## 2023-02-22 VITALS
SYSTOLIC BLOOD PRESSURE: 143 MMHG | BODY MASS INDEX: 22.13 KG/M2 | WEIGHT: 146 LBS | OXYGEN SATURATION: 94 % | RESPIRATION RATE: 18 BRPM | TEMPERATURE: 98 F | HEIGHT: 68 IN | HEART RATE: 88 BPM | DIASTOLIC BLOOD PRESSURE: 67 MMHG

## 2023-02-22 DIAGNOSIS — R51.9 NONINTRACTABLE HEADACHE, UNSPECIFIED CHRONICITY PATTERN, UNSPECIFIED HEADACHE TYPE: Primary | ICD-10-CM

## 2023-02-22 PROCEDURE — 96374 THER/PROPH/DIAG INJ IV PUSH: CPT

## 2023-02-22 PROCEDURE — 99284 PR EMERGENCY DEPT VISIT,LEVEL IV: ICD-10-PCS | Mod: ,,, | Performed by: EMERGENCY MEDICINE

## 2023-02-22 PROCEDURE — 63600175 PHARM REV CODE 636 W HCPCS

## 2023-02-22 PROCEDURE — 99284 EMERGENCY DEPT VISIT MOD MDM: CPT | Mod: ,,, | Performed by: EMERGENCY MEDICINE

## 2023-02-22 PROCEDURE — 99284 EMERGENCY DEPT VISIT MOD MDM: CPT | Mod: 25

## 2023-02-22 RX ORDER — KETOROLAC TROMETHAMINE 30 MG/ML
10 INJECTION, SOLUTION INTRAMUSCULAR; INTRAVENOUS
Status: COMPLETED | OUTPATIENT
Start: 2023-02-22 | End: 2023-02-22

## 2023-02-22 RX ADMIN — KETOROLAC TROMETHAMINE 10 MG: 30 INJECTION, SOLUTION INTRAMUSCULAR; INTRAVENOUS at 04:02

## 2023-02-22 NOTE — ED TRIAGE NOTES
"Presents from triage with c/o headache in the back of the head that "comes and goes." Pt also c/o nausea. Pt states head hurts wherever he lays his head. Pt states he took 2 tylenol pms about 2-3 hours ago.     Past Medical History:   Diagnosis Date    Arthritis     OA    Diabetes mellitus type II     Encounter for blood transfusion     Hyperlipidemia     Hypertension        Past Surgical History:   Procedure Laterality Date    CAROTID ARTERY ANGIOPLASTY  2005    carotid surgery      CATARACT EXTRACTION, BILATERAL  12    ILIAC ARTERY STENT Bilateral 2013    aortic bifurcation    INGUINAL HERNIA REPAIR Right 2014    JOINT REPLACEMENT      L knee    KNEE SURGERY  2015    Right        Family History   Problem Relation Age of Onset    Cancer Father         lung       Social History     Socioeconomic History    Marital status:    Tobacco Use    Smoking status: Former     Types: Cigarettes     Quit date: 1980     Years since quittin.6    Smokeless tobacco: Never   Substance and Sexual Activity    Alcohol use: Yes     Comment: Socailly    Drug use: No    Sexual activity: Not Currently       No current facility-administered medications for this encounter.     Current Outpatient Medications   Medication Sig Dispense Refill    aspirin (ECOTRIN) 81 MG EC tablet Take 81 mg by mouth once daily.      baclofen (LIORESAL) 10 MG tablet   0    cilostazoL (PLETAL) 50 MG Tab Take 1 tablet (50 mg total) by mouth 2 (two) times daily. If patient tolerate medication after 4 weeks, increase dose to 100 mg twice daily. 90 tablet 11    clopidogreL (PLAVIX) 75 mg tablet Take 1 tablet (75 mg total) by mouth once daily. 30 tablet 11    clopidogreL (PLAVIX) 75 mg tablet Take 1 tablet (75 mg total) by mouth once daily. 30 tablet 11    hydrocodone-acetaminophen 5-325mg (NORCO) 5-325 mg per tablet Take 1-2 tablets by mouth every 4 (four) hours as needed for Pain. 40 tablet 0    lisinopriL (PRINIVIL,ZESTRIL) 20 MG tablet " Take 0.5 tablets (10 mg total) by mouth once daily. Takes only 10 mg every am. 30 tablet 0    metformin (GLUCOPHAGE) 500 MG tablet Take by mouth. 1 Tablet Oral Twice a day       methylPREDNISolone (MEDROL DOSEPACK) 4 mg tablet   0    multivitamin capsule Take by mouth. 1 Capsule Oral Every day      simvastatin (ZOCOR) 40 MG tablet Take by mouth. 1 Tablet Oral Every day         Review of patient's allergies indicates:   Allergen Reactions    Adhesive Other (See Comments)     All tape pulls & tears skin. Paper tape is OK to use, pt. States.

## 2023-02-22 NOTE — ED NOTES
Pt identifiers Janak OCONNELL Adrian Richter Were checked and are correct  LOC: The patient is awake, alert, aware of environment with an appropriate affect. Oriented x4, speaking appropriately  APPEARANCE: Pt resting comfortably, in no acute distress, pt is clean and well groomed, clothing properly fastened  SKIN: Skin warm, dry and intact, normal skin turgor, moist mucus membranes  RESPIRATORY: Airway is open and patent, respirations are spontaneous, even and unlabored, normal effort and rate Breath sounds clear gerry to all lung fields on auscultation  CARDIAC: Radial pulses strong and irregular , no peripheral edema noted, capillary refill < 3 seconds, bilateral radial pulses 2+  ABDOMEN: Soft, nontender, nondistended. Bowel sounds present   NEUROLOGIC: PERRL, facial expression is symmetrical, patient moving all extremities spontaneously, normal sensation in all extremities when touched with a finger.  Follows all commands appropriately  MUSCULOSKELETAL: No obvious deformities.

## 2023-02-22 NOTE — SUBJECTIVE & OBJECTIVE
Past Medical History:   Diagnosis Date    Arthritis     OA    Diabetes mellitus type II     Encounter for blood transfusion     Hyperlipidemia     Hypertension      Past Surgical History:   Procedure Laterality Date    CAROTID ARTERY ANGIOPLASTY  2005    carotid surgery      CATARACT EXTRACTION, BILATERAL  12    ILIAC ARTERY STENT Bilateral     aortic bifurcation    INGUINAL HERNIA REPAIR Right 2014    JOINT REPLACEMENT      L knee    KNEE SURGERY  2015    Right      Family History   Problem Relation Age of Onset    Cancer Father         lung     Social History     Tobacco Use    Smoking status: Former     Types: Cigarettes     Quit date: 1980     Years since quittin.6    Smokeless tobacco: Never   Substance Use Topics    Alcohol use: Yes     Comment: Socailly    Drug use: No     Review of patient's allergies indicates:   Allergen Reactions    Adhesive Other (See Comments)     All tape pulls & tears skin. Paper tape is OK to use, pt. States.       Medications: I have reviewed the current medication administration record.    (Not in a hospital admission)      Review of Systems   Constitutional:  Negative for activity change and fatigue.   HENT:  Negative for drooling and trouble swallowing.    Eyes:  Negative for photophobia and visual disturbance.   Respiratory:  Negative for cough and shortness of breath.    Cardiovascular:  Negative for chest pain and palpitations.   Gastrointestinal:  Negative for nausea and vomiting.   Musculoskeletal:  Negative for neck pain and neck stiffness.   Skin:  Negative for rash and wound.   Neurological:  Positive for facial asymmetry, speech difficulty and numbness. Negative for weakness and headaches.   Psychiatric/Behavioral:  Negative for confusion. The patient is not nervous/anxious.    Objective:     Vital Signs (Most Recent):  Temp: 98.6 °F (37 °C) (23 1839)  Pulse: 103 (23 1839)  Resp: 15 (23 1741)  BP: (!) 179/97 (23  1839)  SpO2: 97 % (02/21/23 1839)    Vital Signs Range (Last 24H):  Temp:  [97.7 °F (36.5 °C)-98.6 °F (37 °C)]   Pulse:  [103]   Resp:  [15]   BP: (155-179)/(74-97)   SpO2:  [95 %-97 %]     Physical Exam  Constitutional:       General: He is not in acute distress.  HENT:      Head: Normocephalic.   Eyes:      Extraocular Movements: Extraocular movements intact.   Cardiovascular:      Rate and Rhythm: Normal rate.   Pulmonary:      Effort: Pulmonary effort is normal.   Abdominal:      General: Abdomen is flat.   Musculoskeletal:         General: Normal range of motion.   Skin:     General: Skin is warm and dry.   Neurological:      Mental Status: He is alert and oriented to person, place, and time.      Cranial Nerves: No dysarthria or facial asymmetry.      Motor: No weakness.      Coordination: Finger-Nose-Finger Test normal.   Psychiatric:         Mood and Affect: Mood normal.       Neurological Exam:   LOC: alert  Attention Span: Good   Language: No aphasia  Articulation: No dysarthria  Orientation: Person, Place, Time   Visual Fields: Full  EOM (CN III, IV, VI): Full/intact  Facial Sensation (CN V): Normal  Motor: Arm left  Normal 5/5  Leg left  Normal 5/5  Arm right  Normal 5/5  Leg right Normal 5/5  Cerebellum: No evidence of appendicular or axial ataxia  Sensation: Intact to light touch, temperature and vibration      Laboratory:  CMP:   Recent Labs   Lab 02/21/23 1838   CALCIUM 9.6   ALBUMIN 3.9   PROT 6.5      K 4.6   CO2 26   CL 96   BUN 23   CREATININE 1.0   ALKPHOS 52*   ALT 16   AST 17   BILITOT 0.4     CBC:   Recent Labs   Lab 02/21/23 1838   WBC 10.16   RBC 4.51*   HGB 11.7*   HCT 36.4*      MCV 81*   MCH 25.9*   MCHC 32.1     Lipid Panel: No results for input(s): CHOL, LDLCALC, HDL, TRIG in the last 168 hours.  Coagulation:   Recent Labs   Lab 02/21/23 1838   INR 1.0     Hgb A1C: No results for input(s): HGBA1C in the last 168 hours.  TSH:   Recent Labs   Lab 02/21/23 1838   TSH  2.667       Diagnostic Results:      Brain imaging:  MRI Brain 2/21/2023  No acute intracranial abnormality.     Generalized cerebral volume loss with sequela of chronic microvascular ischemic change.     Empty sella configuration nonspecific but can be seen with idiopathic intracranial hypertension.

## 2023-02-22 NOTE — ED PROVIDER NOTES
Encounter Date: 2/22/2023       History     Chief Complaint   Patient presents with    Headache     Throbbing occipital HA and nausea, d/c 2/21 at 2040 after TIA dx with negative MRI. Pt concerned for return of symptoms. Denies unilateral weakness/numb/ess/tingling/vision changes/speech changes/facial droop     82-year-old with history of diabetes, hyperlipidemia, hypertension is presenting to the emergency department recurrent headache.  Patient reports intermittent posterior headache for the past 5 days.  States that he has been seen at the hospital multiple times.  He was initially admitted 1 day and then again for 2 days for stroke workup.  He had an MRI yesterday that was without acute infarct but did empty sella which could be seen with idiopathic intracranial hypertension.  Patient was started on Plavix and was to follow-up stroke team as an outpatient.  He returns today because of persistent headache.  He notes associated nausea without vomiting.  Patient denies any neck pain or stiffness or fever.  He denies any focal weakness, numbness, changes in speech or vision, difficulty walking.     The history is provided by the patient. No  was used.   Review of patient's allergies indicates:   Allergen Reactions    Adhesive Other (See Comments)     All tape pulls & tears skin. Paper tape is OK to use, pt. States.     Past Medical History:   Diagnosis Date    Arthritis     OA    Diabetes mellitus type II     Encounter for blood transfusion     Hyperlipidemia     Hypertension      Past Surgical History:   Procedure Laterality Date    CAROTID ARTERY ANGIOPLASTY  2005    carotid surgery      CATARACT EXTRACTION, BILATERAL  6-4-12    ILIAC ARTERY STENT Bilateral 2013    aortic bifurcation    INGUINAL HERNIA REPAIR Right 2014    JOINT REPLACEMENT      L knee    KNEE SURGERY  03/11/2015    Right      Family History   Problem Relation Age of Onset    Cancer Father         lung     Social History      Tobacco Use    Smoking status: Former     Types: Cigarettes     Quit date: 1980     Years since quittin.6    Smokeless tobacco: Never   Substance Use Topics    Alcohol use: Yes     Comment: Socailly    Drug use: No     Review of Systems   Constitutional:         See HPI     Physical Exam     Initial Vitals [23 0220]   BP Pulse Resp Temp SpO2   (!) 143/67 88 18 97.8 °F (36.6 °C) (!) 94 %      MAP       --         Physical Exam    Nursing note and vitals reviewed.  Constitutional: He appears well-developed and well-nourished. He is not diaphoretic. No distress.   HENT:   Head: Normocephalic and atraumatic.   Eyes: Conjunctivae and EOM are normal.   Neck: Neck supple.   Normal range of motion.  Cardiovascular:  Normal rate, regular rhythm, normal heart sounds and intact distal pulses.           Pulmonary/Chest: Breath sounds normal. No respiratory distress. He has no wheezes. He has no rhonchi. He has no rales.   Abdominal: Abdomen is soft. Bowel sounds are normal. He exhibits no distension. There is no abdominal tenderness. There is no rebound and no guarding.   Musculoskeletal:         General: No tenderness or edema. Normal range of motion.      Cervical back: Normal range of motion and neck supple.     Neurological: He is alert and oriented to person, place, and time. He has normal strength. No cranial nerve deficit or sensory deficit.   Normal gait.  No pronator drift.  No dysmetria with finger-to-nose or heel-to-shin   Skin: Skin is warm and dry.   Psychiatric: He has a normal mood and affect. Thought content normal.       ED Course   Procedures  Labs Reviewed - No data to display       Imaging Results    None          Medications   ketorolac injection 9.999 mg (9.999 mg Intravenous Given 23 0406)     Medical Decision Making:   History:   Old Medical Records: I decided to obtain old medical records.  Old Records Summarized: records from previous admission(s).       <> Summary of Records:  MRI from yesterday  Impression:  No acute intracranial abnormality.  Generalized cerebral volume loss with sequela of chronic microvascular ischemic change.  Empty sella configuration nonspecific but can be seen with idiopathic intracranial hypertension.  Left frontal scalp 5 mm lesion suggestive of small complex/sebaceous cyst.  Clinical correlation advised.  Initial Assessment:   82-year-old with history of diabetes, hyperlipidemia, hypertension is presenting to the emergency department recurrent headache.  Patient has had recent extensive workup for TIA/stroke.  Had MRI yesterday that was normal.  He has no focal neurologic deficit on exam. Will treat symptoms and reassess  Differential Diagnosis:   Tension headache, cluster headache, migraine headache, anxiety, doubt trigeminal neuralgia doubt CVA, doubt intracranial hemorrhage  ED Management:  See ED course.  Patient felt improved.  Stable for discharge.            ED Course as of 02/22/23 0450   Wed Feb 22, 2023   0405 Patient is refusing IV.  Will give Toradol IM [KL]   0447 Patient was re-evaluated.  He feels improved and ready to go home.  Discussed return precautions.  Stable for discharge and  outpatient follow up.  [KL]      ED Course User Index  [KL] Monica Horne MD                 Clinical Impression:   Final diagnoses:  [R51.9] Nonintractable headache, unspecified chronicity pattern, unspecified headache type (Primary)        ED Disposition Condition    Discharge Stable          ED Prescriptions    None       Follow-up Information       Follow up With Specialties Details Why Contact Info    [facility] Emergency Medicine Go to  As needed, If symptoms worsen [id number]  [facility] [address] [phone]    Jamel Thao MD Family Medicine Schedule an appointment as soon as possible for a visit in 2 days  [address] [facility] [address] [phone]               Monica Horne MD  Resident  02/22/23  [id number]

## 2023-02-22 NOTE — ED TRIAGE NOTES
Pt complains of right arm weakness starting art 16:45   Pt was admitted to [facility] [place] twice for weakness in left arm

## 2023-02-22 NOTE — CONSULTS
[facility] - Emergency Dept  Vascular Neurology  [facility]  Consult Note    Inpatient consult to Vascular (Stroke) Neurology  Consult performed by: Adelia Mercedes NP  Consult ordered by: Russ Hardin MD        Assessment/Plan:     Patient is a 82 y.o. year old male with:    * Transient cerebral ischemia  83 yo male with PMH of DM, HTN, HLD, and 2 recent TIAs in 2/2023 who presented to the ED with c/o r had numbness, slurred speech, and facial droop that resolved within 20 minutes upon arrival to the ED. Patient reports that two weeks ago he had two TIAs two days apart with c/o left hand numbness. He went to [facility] and MRI was negative for acute infarct and with unremarkable MRA of the intracranial and extra cranial circulation. Echo showed EF of 50% with regional wall motion abnormalities. He reports that before his TIAs occurred he had stopped taking his medication due to a recent GI bleed but since the TIAs occured he has started taking his medications again. NIHHS 0 today. MRI obtained today showed no acute intracranial abnormality.     Recommend: patient to follow up in the vascular Neurology clinic in 4-6 weeks. Will discharge on aspiring 81mg daily, atorvastatin 40mg daily, plavix 75mg 21 days, and a 30 day cardiac event monitor to detect arrhythmias.     Antithrombotics for secondary stroke prevention: Antiplatelets: Aspirin: 81 mg daily  Clopidogrel: 75 mg daily    Statins for secondary stroke prevention and hyperlipidemia, if present:   Statins: Atorvastatin- 40 mg daily    Aggressive risk factor modification: HTN, DM, HLD               STROKE DOCUMENTATION          NIH Scale:  1a. Level of Consciousness: 0-->Alert, keenly responsive  1b. LOC Questions: 0-->Answers both questions correctly  1c. LOC Commands: 0-->Performs both tasks correctly  2. Best Gaze: 0-->Normal  3. Visual: 0-->No visual loss  4. Facial Palsy: 0-->Normal symmetrical movements  5a. Motor Arm, Left:  0-->No drift, limb holds 90 (or 45) degrees for full 10 secs  5b. Motor Arm, Right: 0-->No drift, limb holds 90 (or 45) degrees for full 10 secs  6a. Motor Leg, Left: 0-->No drift, leg holds 30 degree position for full 5 secs  6b. Motor Leg, Right: 0-->No drift, leg holds 30 degree position for full 5 secs  7. Limb Ataxia: 0-->Absent  8. Sensory: 0-->Normal, no sensory loss  9. Best Language: 0-->No aphasia, normal  10. Dysarthria: 0-->Normal  11. Extinction and Inattention (formerly Neglect): 0-->No abnormality  Total (NIH Stroke Scale): 0    Modified [place] Score: 0  Audrey Coma Scale:    ABCD2 Score: 5  [id number] Score:   HAS -BLED Score:   ICH Score:   Hunt & Chance Classification:       Thrombolysis Candidate? No, Patient back to neurological baseline , Non-disabling symptoms - Low NIHSS     Delays to Thrombolysis?  No    Interventional Revascularization Candidate?   Is the patient eligible for mechanical endovascular reperfusion (SACHIN)?  No; No large vessel occlusion identified on imaging  and No; at this time symptoms not suggestive of large vessel occlusion    Delays to Thrombectomy? No    Hemorrhagic change of an Ischemic Stroke: Does this patient have an ischemic stroke with hemorrhagic changes? No     Subjective:     History of Present Illness:  83 yo male with PMH of DM, HTN, HLD, and 2 recent TIAs in 2/2023 who presented to the ED with c/o r had numbness, slurred speech, and facial droop that resolved within 20 minutes upon arrival to the ED. Patient reports that two weeks ago he had two TIAs two days apart with c/o left hand numbness. He went to [facility] and MRI was negative for acute infarct and with unremarkable MRA of the intracranial and extra cranial circulation. Echo showed EF of 50% with regional wall motion abnormalities. He reports that before his TIAs occurred he had stopped taking his medication due to a recent GI bleed but since the TIAs occured he has started taking his  medications again. NIHHS 0 today. MRI obtained today showed no acute intracranial abnormality.     Recommend: patient to follow up in the vascular Neurology clinic in 4-6 weeks. Will discharge on aspiring 81mg daily, atorvastatin 40mg daily, plavix 75mg 21 days, and a 30 day cardiac event monitor to detect arrhythmias.           Past Medical History:   Diagnosis Date    Arthritis     OA    Diabetes mellitus type II     Encounter for blood transfusion     Hyperlipidemia     Hypertension      Past Surgical History:   Procedure Laterality Date    CAROTID ARTERY ANGIOPLASTY  2005    carotid surgery      CATARACT EXTRACTION, BILATERAL  12    ILIAC ARTERY STENT Bilateral     aortic bifurcation    INGUINAL HERNIA REPAIR Right 2014    JOINT REPLACEMENT      L knee    KNEE SURGERY  2015    Right      Family History   Problem Relation Age of Onset    Cancer Father         lung     Social History     Tobacco Use    Smoking status: Former     Types: Cigarettes     Quit date: 1980     Years since quittin.6    Smokeless tobacco: Never   Substance Use Topics    Alcohol use: Yes     Comment: Socailly    Drug use: No     Review of patient's allergies indicates:   Allergen Reactions    Adhesive Other (See Comments)     All tape pulls & tears skin. Paper tape is OK to use, pt. States.       Medications: I have reviewed the current medication administration record.    (Not in a hospital admission)      Review of Systems   Constitutional:  Negative for activity change and fatigue.   HENT:  Negative for drooling and trouble swallowing.    Eyes:  Negative for photophobia and visual disturbance.   Respiratory:  Negative for cough and shortness of breath.    Cardiovascular:  Negative for chest pain and palpitations.   Gastrointestinal:  Negative for nausea and vomiting.   Musculoskeletal:  Negative for neck pain and neck stiffness.   Skin:  Negative for rash and wound.   Neurological:  Positive for  facial asymmetry, speech difficulty and numbness. Negative for weakness and headaches.   Psychiatric/Behavioral:  Negative for confusion. The patient is not nervous/anxious.    Objective:     Vital Signs (Most Recent):  Temp: 98.6 °F (37 °C) (02/21/23 1839)  Pulse: 103 (02/21/23 1839)  Resp: 15 (02/21/23 1741)  BP: (!) 179/97 (02/21/23 1839)  SpO2: 97 % (02/21/23 1839)    Vital Signs Range (Last 24H):  Temp:  [97.7 °F (36.5 °C)-98.6 °F (37 °C)]   Pulse:  [103]   Resp:  [15]   BP: (155-179)/(74-97)   SpO2:  [95 %-97 %]     Physical Exam  Constitutional:       General: He is not in acute distress.  HENT:      Head: Normocephalic.   Eyes:      Extraocular Movements: Extraocular movements intact.   Cardiovascular:      Rate and Rhythm: Normal rate.   Pulmonary:      Effort: Pulmonary effort is normal.   Abdominal:      General: Abdomen is flat.   Musculoskeletal:         General: Normal range of motion.   Skin:     General: Skin is warm and dry.   Neurological:      Mental Status: He is alert and oriented to person, place, and time.      Cranial Nerves: No dysarthria or facial asymmetry.      Motor: No weakness.      Coordination: Finger-Nose-Finger Test normal.   Psychiatric:         Mood and Affect: Mood normal.       Neurological Exam:   LOC: alert  Attention Span: Good   Language: No aphasia  Articulation: No dysarthria  Orientation: Person, Place, Time   Visual Fields: Full  EOM (CN III, IV, VI): Full/intact  Facial Sensation (CN V): Normal  Motor: Arm left  Normal 5/5  Leg left  Normal 5/5  Arm right  Normal 5/5  Leg right Normal 5/5  Cerebellum: No evidence of appendicular or axial ataxia  Sensation: Intact to light touch, temperature and vibration      Laboratory:  CMP:   Recent Labs   Lab 02/21/23 1838   CALCIUM 9.6   ALBUMIN 3.9   PROT 6.5      K 4.6   CO2 26   CL 96   BUN 23   CREATININE 1.0   ALKPHOS 52*   ALT 16   AST 17   BILITOT 0.4     CBC:   Recent Labs   Lab 02/21/23 1838   WBC 10.16   RBC  4.51*   HGB 11.7*   HCT 36.4*      MCV 81*   MCH 25.9*   MCHC 32.1     Lipid Panel: No results for input(s): CHOL, LDLCALC, HDL, TRIG in the last 168 hours.  Coagulation:   Recent Labs   Lab 02/21/23  1838   INR 1.0     Hgb A1C: No results for input(s): HGBA1C in the last 168 hours.  TSH:   Recent Labs   Lab 02/21/23  1838   TSH 2.667       Diagnostic Results:      Brain imaging:  MRI Brain 2/21/2023  No acute intracranial abnormality.     Generalized cerebral volume loss with sequela of chronic microvascular ischemic change.     Empty sella configuration nonspecific but can be seen with idiopathic intracranial hypertension.        Adelia Mercedes NP  [facility]  Department of Vascular Neurology   [facility] - Emergency Dept

## 2023-02-22 NOTE — HPI
81 yo male with PMH of DM, HTN, HLD, and 2 recent TIAs in 2/2023 who presented to the ED with c/o r had numbness, slurred speech, and facial droop that resolved within 20 minutes upon arrival to the ED. Patient reports that two weeks ago he had two TIAs two days apart with c/o left hand numbness. He went to [facility] and MRI was negative for acute infarct and with unremarkable MRA of the intracranial and extra cranial circulation. Echo showed EF of 50% with regional wall motion abnormalities. He reports that before his TIAs occurred he had stopped taking his medication due to a recent GI bleed but since the TIAs occured he has started taking his medications again. NIHHS 0 today. MRI obtained today showed no acute intracranial abnormality.     Recommend: patient to follow up in the vascular Neurology clinic in 4-6 weeks. Will discharge on aspiring 81mg daily, atorvastatin 40mg daily, plavix 75mg 21 days, and a 30 day cardiac event monitor to detect arrhythmias.

## 2023-02-22 NOTE — DISCHARGE INSTRUCTIONS
Diagnosis: headache    Tests today showed:   Labs Reviewed - No data to display  No orders to display       Treatments you had today:   Medications   ketorolac injection 9.999 mg (9.999 mg Intravenous Given 2/22/23 0406)       Follow-Up Plan:  - Follow-up with primary care doctor within 3 - 5 days  - Additional testing and/or evaluation as directed by your primary doctor    Return to the Emergency Department for symptoms including but not limited to: worsening symptoms, shortness of breath or chest pain, vomiting with inability to hold down fluids, fevers greater than 100.4°F, passing out/fainting/unconsciousness, or other concerning symptoms.

## 2023-02-22 NOTE — ED PROVIDER NOTES
Chief Complaint   Extremity Weakness (Started 20 minutes ago resolved on arrival to ED. Patient reports R arm weakness and facial droop with aphasia. No neuro deficits on arrival patient states back to normal. Patient states 3 similar episodes over the past the month)      History Of Present Illness   Janak Campbell Jr. is a 82 y.o. male presenting with right-sided arm weakness, facial droop and slurred speech that started shortly prior to arrival.  It lasted for about 20 minutes and resolved.  He currently has no symptoms.  Patient states he is had 2 prior episodes similar to this, but on the left side instead of the right.  He states he had workups at [facility].  His MRIs and other tests were all normal per his recollection.      History obtained from: Patient and family    Review of patient's allergies indicates:   Allergen Reactions    Adhesive Other (See Comments)     All tape pulls & tears skin. Paper tape is OK to use, pt. States.       No current facility-administered medications on file prior to encounter.     Current Outpatient Medications on File Prior to Encounter   Medication Sig Dispense Refill    aspirin (ECOTRIN) 81 MG EC tablet Take 81 mg by mouth once daily.      metformin (GLUCOPHAGE) 500 MG tablet Take by mouth. 1 Tablet Oral Twice a day       multivitamin capsule Take by mouth. 1 Capsule Oral Every day      simvastatin (ZOCOR) 40 MG tablet Take by mouth. 1 Tablet Oral Every day      baclofen (LIORESAL) 10 MG tablet   0    cilostazoL (PLETAL) 50 MG Tab Take 1 tablet (50 mg total) by mouth 2 (two) times daily. If patient tolerate medication after 4 weeks, increase dose to 100 mg twice daily. 90 tablet 11    clopidogreL (PLAVIX) 75 mg tablet Take 1 tablet (75 mg total) by mouth once daily. 30 tablet 11    hydrocodone-acetaminophen 5-325mg (NORCO) 5-325 mg per tablet Take 1-2 tablets by mouth every 4 (four) hours as needed for Pain. 40 tablet 0    methylPREDNISolone (MEDROL DOSEPACK) 4 mg  "tablet   0    [DISCONTINUED] clopidogreL (PLAVIX) 75 mg tablet Take 1 tablet (75 mg total) by mouth once daily. 30 tablet 11    [DISCONTINUED] lisinopril (PRINIVIL,ZESTRIL) 20 MG tablet Takes only 10 mg every am.         Past History   As per HPI and below:  Past Medical History:   Diagnosis Date    Arthritis     OA    Diabetes mellitus type II     Encounter for blood transfusion     Hyperlipidemia     Hypertension      Past Surgical History:   Procedure Laterality Date    CAROTID ARTERY ANGIOPLASTY  2005    carotid surgery      CATARACT EXTRACTION, BILATERAL  12    ILIAC ARTERY STENT Bilateral     aortic bifurcation    INGUINAL HERNIA REPAIR Right 2014    JOINT REPLACEMENT      L knee    KNEE SURGERY  2015    Right        Social History     Socioeconomic History    Marital status:    Tobacco Use    Smoking status: Former     Types: Cigarettes     Quit date: 1980     Years since quittin.6    Smokeless tobacco: Never   Substance and Sexual Activity    Alcohol use: Yes     Comment: Socailly    Drug use: No    Sexual activity: Not Currently       Family History   Problem Relation Age of Onset    Cancer Father         lung       Physical Exam     Vitals:    23 1741 23 1839   BP: (!) 155/74 (!) 179/97   BP Location:  Right arm   Patient Position:  Lying   Pulse: 103 103   Resp: 15    Temp: 97.7 °F (36.5 °C) 98.6 °F (37 °C)   TempSrc: Oral Oral   SpO2: 95% 97%   Weight: 66.2 kg (146 lb)    Height: 5' 8" (1.727 m)      Appearance: No acute distress.  Skin: No rashes seen.  Good turgor.  No abrasions.  No ecchymoses.  Eyes: No conjunctival injection.  ENT: Oropharynx clear.    Chest: Clear to auscultation bilaterally.  Good air movement.  No wheezes.  No rhonchi.  Cardiovascular: Regular rate and rhythm.  No murmurs. No gallops. No rubs.  Abdomen: Soft.  Not distended.  Nontender.  No guarding.  No rebound.  Musculoskeletal: Good range of motion all joints.  No deformities.  Neck " supple.  No meningismus.  Neurologic: Motor intact.  Sensation intact.  Cerebellar intact.  Cranial nerves intact.  Mental Status:  Alert and oriented x 3.  Appropriate, conversant.      Initial MDM   Right-sided weakness, facial droop and slurred speech that resolved prior to arrival.  NIH stroke scale of 0.  Discussed with stroke team.  Will obtain MRI and stroke workup.  They will come see the patient.    Medications Given     Medications   lisinopriL tablet 10 mg (has no administration in time range)   clopidogreL tablet 75 mg (has no administration in time range)       Results and Course     Labs Reviewed   CBC W/ AUTO DIFFERENTIAL - Abnormal; Notable for the following components:       Result Value    RBC 4.51 (*)     Hemoglobin 11.7 (*)     Hematocrit 36.4 (*)     MCV 81 (*)     MCH 25.9 (*)     RDW 22.5 (*)     Mono # 1.1 (*)     All other components within normal limits    Narrative:     Release to patient->Immediate   COMPREHENSIVE METABOLIC PANEL - Abnormal; Notable for the following components:    Alkaline Phosphatase 52 (*)     All other components within normal limits    Narrative:     Release to patient->Immediate   HIV 1 / 2 ANTIBODY    Narrative:     Release to patient->Immediate   HEPATITIS C ANTIBODY    Narrative:     Release to patient->Immediate   PROTIME-INR    Narrative:     Release to patient->Immediate   TSH    Narrative:     Release to patient->Immediate       Imaging Results              MRI Brain Without Contrast (Final result)  Result time 02/21/23 19:35:48      Final result by Ata Nicholson MD (02/21/23 19:35:48)                   Impression:      No acute intracranial abnormality.    Generalized cerebral volume loss with sequela of chronic microvascular ischemic change.    Empty sella configuration nonspecific but can be seen with idiopathic intracranial hypertension.    Left frontal scalp 5 mm lesion suggestive of small complex/sebaceous cyst.  Clinical correlation  advised.      Electronically signed by: Ata Nicholson MD  Date:    02/21/2023  Time:    19:35               Narrative:    EXAMINATION:  MRI BRAIN WITHOUT CONTRAST    CLINICAL HISTORY:  Transient ischemic attack (TIA);.    TECHNIQUE:  Multiplanar multisequence MR imaging of the brain was performed without contrast.    COMPARISON:  None    FINDINGS:  Intracranial compartment: Brain appears normally formed.    Age-related generalized cerebral volume loss with a frontal lobe predominance.  No extra-axial blood or fluid collections.  Nonspecific empty sella configuration.    Patchy nonspecific T2/FLAIR hyperintense signal of the bilateral subcortical and periventricular white matter.  No mass lesion, acute hemorrhage, edema or acute infarct.    Normal vascular flow voids are preserved.    Skull/extracranial contents (limited evaluation): Bone marrow signal intensity is normal.  Craniocervical junction is within normal limits.  Patchy mucosal thickening of the bilateral anterior middle ethmoidal air cells.  Mastoid air cells are clear.  5 mm intermediate T1 and slightly hyperintense T2 signal focus within the superficial subcutaneous soft tissues overlying the left frontal calvarium which may reflect a small complex/sebaceous cyst.                                      ED Course as of 02/21/23 2029 Tue Feb 21, 2023 1805 Discussed with stroke team [DC]   1849 WBC: 10.16 [DC]   1852 Hemoglobin(!): 11.7 [DC]   1852 Platelets: 236 [DC]   1907 ECG 12 lead  EKG shows normal sinus rhythm, RBBB and no acute ischemia per my independent interpretation.     [DC]   1907 INR: 1.0 [DC]   1907 Creatinine: 1.0 [DC]   1944 HIV 1/2 Ag/Ab: Non-reactive [DC]   1944 Hepatitis C Ab: Non-reactive [DC]   1944 TSH: 2.667 [DC]      ED Course User Index  [DC] Russ Hardin MD           MDM, Impression and Plan   82 y.o. male with TIA symptoms, currently resolved.  Benign neuro exam throughout stay in the ED.  Stroke team saw him and  recommended MRI, which was negative.  Discussed results with the stroke team.  They recommend discharge with follow-up in their clinic.  They recommend restarting Plavix.  The patient already takes a baby aspirin and a statin.  They will order an outpatient event monitor.  Will give 1st dose of Plavix tonight.  Patient was taken off his blood pressure medication, but is hypertensive here.  Will start lisinopril at low dose.  Will give a dose here.  Patient understands he is to follow-up his pressure with his PCP to further fine tune the regimen.           Final diagnoses:  [G45.9] TIA (transient ischemic attack) (Primary)  [I10] Primary hypertension        ED Disposition Condition    Discharge Stable          ED Prescriptions       Medication Sig Dispense Start Date End Date Auth. Provider    clopidogreL (PLAVIX) 75 mg tablet Take 1 tablet (75 mg total) by mouth once daily. 30 tablet 2/21/2023 2/21/2024 Russ Hardin MD    lisinopriL (PRINIVIL,ZESTRIL) 20 MG tablet Take 0.5 tablets (10 mg total) by mouth once daily. Takes only 10 mg every am. 30 tablet 2/21/2023 -- Russ Hardin MD          Follow-up Information       Follow up With Specialties Details Why Contact Info Additional Information    [facility] - Neurology [facility] Neurology Schedule an appointment as soon as possible for a visit in 1 month  [id number]  [facility] [id number] [phone] Neuro[facility] [place] - Main Building, 7th Floor Please park in [facility] and take Clinic elevator               Russ Hardin MD  02/21/23 203

## 2024-07-05 ENCOUNTER — OFFICE VISIT (OUTPATIENT)
Dept: URGENT CARE | Facility: CLINIC | Age: 84
End: 2024-07-05
Payer: MEDICARE

## 2024-07-05 VITALS
SYSTOLIC BLOOD PRESSURE: 122 MMHG | WEIGHT: 145.94 LBS | BODY MASS INDEX: 22.12 KG/M2 | DIASTOLIC BLOOD PRESSURE: 62 MMHG | TEMPERATURE: 98 F | RESPIRATION RATE: 18 BRPM | HEART RATE: 85 BPM | HEIGHT: 68 IN

## 2024-07-05 DIAGNOSIS — L03.012 CELLULITIS OF LEFT RING FINGER: Primary | ICD-10-CM

## 2024-07-05 RX ORDER — AMOXICILLIN AND CLAVULANATE POTASSIUM 875; 125 MG/1; MG/1
1 TABLET, FILM COATED ORAL EVERY 12 HOURS
Qty: 14 TABLET | Refills: 0 | Status: SHIPPED | OUTPATIENT
Start: 2024-07-05 | End: 2024-07-05

## 2024-07-05 RX ORDER — FERROUS SULFATE 325(65) MG
1 TABLET ORAL DAILY
COMMUNITY

## 2024-07-05 RX ORDER — DEXTROSE 4 G
1 TABLET,CHEWABLE ORAL
COMMUNITY

## 2024-07-05 RX ORDER — AMOXICILLIN AND CLAVULANATE POTASSIUM 875; 125 MG/1; MG/1
1 TABLET, FILM COATED ORAL EVERY 12 HOURS
Qty: 14 TABLET | Refills: 0 | Status: SHIPPED | OUTPATIENT
Start: 2024-07-05 | End: 2024-07-12

## 2024-07-05 RX ORDER — SIMVASTATIN 40 MG/1
1 TABLET, FILM COATED ORAL NIGHTLY
COMMUNITY
Start: 2024-07-01

## 2024-07-05 RX ORDER — CLOPIDOGREL BISULFATE 75 MG/1
1 TABLET ORAL DAILY
COMMUNITY
Start: 2024-06-10

## 2024-07-05 RX ORDER — DORZOLAMIDE HCL 20 MG/ML
SOLUTION/ DROPS OPHTHALMIC
COMMUNITY

## 2024-07-05 RX ORDER — LISINOPRIL 2.5 MG/1
1 TABLET ORAL
COMMUNITY

## 2024-07-05 RX ORDER — ESCITALOPRAM OXALATE 10 MG/1
10 TABLET ORAL
COMMUNITY

## 2024-07-05 NOTE — PROGRESS NOTES
"Subjective:      Patient ID: Janak Campbell Jr. is a 83 y.o. male.    Vitals:  height is 5' 8" (1.727 m) and weight is 66.2 kg (145 lb 15.1 oz). His oral temperature is 98 °F (36.7 °C). His blood pressure is 122/62 and his pulse is 85. His respiration is 18.     Chief Complaint: Hand Pain    Pt state he fell out of bed about 2 weeks ago and hit his Lt ring finger . Pt use neosporin on it but it will not heal.     Hand Pain   His dominant hand is their left hand. Incident onset: 2 weeks. The incident occurred at home. The injury mechanism was a fall. The pain is present in the left hand. The quality of the pain is described as burning. The pain is at a severity of 0/10. The pain is mild. Nothing aggravates the symptoms.     ROS   Objective:     Physical Exam   Constitutional: He is oriented to person, place, and time.   HENT:   Head: Normocephalic.   Ears:   Right Ear: External ear normal.   Left Ear: External ear normal.   Nose: Nose normal.   Mouth/Throat: Mucous membranes are moist.   Eyes: Conjunctivae are normal.   Cardiovascular: Normal rate.   Pulmonary/Chest: Effort normal.   Musculoskeletal: Normal range of motion.         General: Normal range of motion.   Neurological: He is alert and oriented to person, place, and time.   Skin: Skin is dry.         Comments: Left index finger mid way inflammation small close laceration redness tender   Psychiatric: His behavior is normal.       Assessment:     1. Cellulitis of left ring finger        Plan:       Cellulitis of left ring finger  -     Discontinue: amoxicillin-clavulanate 875-125mg (AUGMENTIN) 875-125 mg per tablet; Take 1 tablet by mouth every 12 (twelve) hours. for 7 days  Dispense: 14 tablet; Refill: 0  -     amoxicillin-clavulanate 875-125mg (AUGMENTIN) 875-125 mg per tablet; Take 1 tablet by mouth every 12 (twelve) hours. for 7 days  Dispense: 14 tablet; Refill: 0      Rtc prn              "